# Patient Record
Sex: FEMALE | Race: WHITE | NOT HISPANIC OR LATINO
[De-identification: names, ages, dates, MRNs, and addresses within clinical notes are randomized per-mention and may not be internally consistent; named-entity substitution may affect disease eponyms.]

---

## 2022-06-21 ENCOUNTER — APPOINTMENT (OUTPATIENT)
Dept: ORTHOPEDIC SURGERY | Facility: CLINIC | Age: 87
End: 2022-06-21
Payer: MEDICARE

## 2022-06-21 DIAGNOSIS — I10 ESSENTIAL (PRIMARY) HYPERTENSION: ICD-10-CM

## 2022-06-21 DIAGNOSIS — Z90.13 ACQUIRED ABSENCE OF BILATERAL BREASTS AND NIPPLES: ICD-10-CM

## 2022-06-21 DIAGNOSIS — Z95.5 PRESENCE OF CORONARY ANGIOPLASTY IMPLANT AND GRAFT: ICD-10-CM

## 2022-06-21 PROCEDURE — 20610 DRAIN/INJ JOINT/BURSA W/O US: CPT | Mod: 50

## 2022-06-21 PROCEDURE — 99213 OFFICE O/P EST LOW 20 MIN: CPT | Mod: 25

## 2022-06-21 RX ORDER — FUROSEMIDE 20 MG/1
20 TABLET ORAL
Qty: 30 | Refills: 0 | Status: ACTIVE | COMMUNITY
Start: 2022-06-08

## 2022-06-21 RX ORDER — LEVOTHYROXINE SODIUM 0.09 MG/1
88 TABLET ORAL
Qty: 90 | Refills: 0 | Status: ACTIVE | COMMUNITY
Start: 2022-01-08

## 2022-06-21 RX ORDER — RAMIPRIL 5 MG/1
5 CAPSULE ORAL
Qty: 90 | Refills: 0 | Status: ACTIVE | COMMUNITY
Start: 2021-10-18

## 2022-06-21 RX ORDER — MIRABEGRON 25 MG/1
25 TABLET, FILM COATED, EXTENDED RELEASE ORAL
Qty: 30 | Refills: 0 | Status: ACTIVE | COMMUNITY
Start: 2022-06-08

## 2022-06-21 RX ORDER — HYDROCORTISONE 1 %
12 CREAM (GRAM) TOPICAL
Qty: 400 | Refills: 0 | Status: ACTIVE | COMMUNITY
Start: 2022-05-31

## 2022-06-21 RX ORDER — ATORVASTATIN CALCIUM 40 MG/1
40 TABLET, FILM COATED ORAL
Qty: 90 | Refills: 0 | Status: ACTIVE | COMMUNITY
Start: 2021-11-23

## 2022-06-21 RX ORDER — CEFPODOXIME PROXETIL 200 MG/1
200 TABLET, FILM COATED ORAL
Qty: 14 | Refills: 0 | Status: COMPLETED | COMMUNITY
Start: 2022-05-02

## 2022-06-21 RX ORDER — FLUTICASONE PROPIONATE 50 UG/1
50 SPRAY, METERED NASAL
Qty: 48 | Refills: 0 | Status: ACTIVE | COMMUNITY
Start: 2022-02-28

## 2022-06-21 RX ORDER — METOPROLOL SUCCINATE 50 MG/1
50 TABLET, EXTENDED RELEASE ORAL
Qty: 90 | Refills: 0 | Status: ACTIVE | COMMUNITY
Start: 2021-11-01

## 2022-06-21 RX ORDER — GABAPENTIN 300 MG/1
300 CAPSULE ORAL
Qty: 270 | Refills: 0 | Status: ACTIVE | COMMUNITY
Start: 2021-10-18

## 2022-06-21 RX ORDER — OMEGA-3/DHA/EPA/FISH OIL 35-113.5MG
1000 TABLET,CHEWABLE ORAL
Qty: 90 | Refills: 0 | Status: ACTIVE | COMMUNITY
Start: 2021-11-29

## 2022-06-21 RX ORDER — DOXYCYCLINE HYCLATE 100 MG/1
100 TABLET ORAL
Qty: 14 | Refills: 0 | Status: COMPLETED | COMMUNITY
Start: 2022-05-14

## 2022-06-21 NOTE — PHYSICAL EXAM
[Bilateral] : knee bilaterally [NL (0)] : extension 0 degrees [4___] : hamstring 4[unfilled]/5 [] : light touch is intact throughout [TWNoteComboBox7] : flexion 100 degrees

## 2022-06-21 NOTE — DISCUSSION/SUMMARY
[de-identified] : The risks, benefits, and alternatives to corticosteroid injection were reviewed with the patient. Risks outlined include, but are not limited to infection, sepsis, bleeding, scarring, skin discoloration, temporary increase in pain, syncopal episode, failure to resolve symptoms, symptoms recurrence, allergic reaction, flare reaction, and elevation of blood sugar in diabetics. Patient understood the risks and asked to proceed with this treatment course.\par \par Progress note completed by ELAINA Irwin.\par \par Physician Instructions:\par The documentation recorded by the PA accurately reflects the service I personally performed and decisions made by me.\par

## 2022-06-21 NOTE — HISTORY OF PRESENT ILLNESS
[2] : 2 [0] : 0 [Dull/Aching] : dull/aching [Localized] : localized [Retired] : Work status: retired [de-identified] : severe oa knees. Jack helpped some . Not a surgical candidate for total knee . pain walking , minimal pain at rest. left knee more pain than r [] : Post Surgical Visit: no [FreeTextEntry1] : bilateral knees [de-identified] : none

## 2022-06-21 NOTE — PROCEDURE
[FreeTextEntry3] : Large Joint Injection was performed in the right knee because of pain and inflammation. \par Zilretta: An injection of Zilretta 5 mg ,  \par Lidocaine: 4 cc\par \par Patient has tried OTC's including aspirin, Ibuprofen, Aleve etc or prescription NSAIDS, and/or exercises at home and/ or physical therapy without satisfactory response and Patient has decreased mobility in the joint. The risks, benefits, and alternatives to cortisone injection were explained in full to the patient. Risks outlined include but are not limited to infection, sepsis, bleeding, scarring, skin discoloration, temporary increase in pain, syncopal episode, failure to resolve symptoms, allergic reaction, symptom recurrence, and elevation of blood sugar in diabetics. Patient understood the risks. All questions were answered. After discussion of options, patient requested an injection. Oral informed consent was obtained. Sterile technique was utilized for the procedure including the preparation of the solutions used for the injection. Injection site was prepped with betadine and alcohol. Ethyl chloride sprayed topically. Sterile technique used. Patient tolerated procedure well. \par \par Post Procedure Instructions: Patient was advised to call if redness, pain, or fever occur. Apply ice for 15 min. every 2 hours for the next 12-24 hours as tolerated. Patient was advised to rest the joint(s) for 1-2 days.\par \par Large Joint Injection was performed in the left knee because of pain and inflammation. \par Zilretta: An injection of Zilretta 5 mg ,  \par Lidocaine: 4 cc\par \par Patient has tried OTC's including aspirin, Ibuprofen, Aleve etc or prescription NSAIDS, and/or exercises at home and/ or physical therapy without satisfactory response and Patient has decreased mobility in the joint. The risks, benefits, and alternatives to cortisone injection were explained in full to the patient. Risks outlined include but are not limited to infection, sepsis, bleeding, scarring, skin discoloration, temporary increase in pain, syncopal episode, failure to resolve symptoms, allergic reaction, symptom recurrence, and elevation of blood sugar in diabetics. Patient understood the risks. All questions were answered. After discussion of options, patient requested an injection. Oral informed consent was obtained. Sterile technique was utilized for the procedure including the preparation of the solutions used for the injection. Injection site was prepped with betadine and alcohol. Ethyl chloride sprayed topically. Sterile technique used. Patient tolerated procedure well. \par \par Post Procedure Instructions: Patient was advised to call if redness, pain, or fever occur. Apply ice for 15 min. every 2 hours for the next 12-24 hours as tolerated. Patient was advised to rest the joint(s) for 1-2 days.\par \par

## 2022-07-07 ENCOUNTER — APPOINTMENT (OUTPATIENT)
Dept: ORTHOPEDIC SURGERY | Facility: CLINIC | Age: 87
End: 2022-07-07

## 2022-07-07 VITALS — BODY MASS INDEX: 33.13 KG/M2 | WEIGHT: 180 LBS | HEIGHT: 62 IN

## 2022-07-07 PROCEDURE — 20552 NJX 1/MLT TRIGGER POINT 1/2: CPT | Mod: 59

## 2022-07-07 PROCEDURE — J3490M: CUSTOM

## 2022-07-07 PROCEDURE — 20526 THER INJECTION CARP TUNNEL: CPT | Mod: RT

## 2022-07-07 PROCEDURE — 99213 OFFICE O/P EST LOW 20 MIN: CPT | Mod: 25

## 2022-07-07 NOTE — PHYSICAL EXAM
[Bilateral] : knee bilaterally [NL (0)] : extension 0 degrees [4___] : hamstring 4[unfilled]/5 [Right] : right hand [Volar Wrist] : volar wrist [5___] : grasp 5[unfilled]/5 [de-identified] : left lateral rotation 60 degrees [TWNoteComboBox6] : right lateral rotation 60 degrees [] : no erythema [TWNoteComboBox7] : flexion 100 degrees

## 2022-07-07 NOTE — HISTORY OF PRESENT ILLNESS
[de-identified] : Follow up today. She had relief with bilateral knee CSI at the last visit. Still notes pain in the right wrist and neck/shoulder.

## 2022-07-07 NOTE — PROCEDURE
2.5 YEAR WELL CHILD EXAMINATION    Nursing notes reviewed and accepted.    Joe Velez is a 2 year old male here with his Mom and Dad for 2.5 year well examination.    Parental concerns include: None .    Interim History:  Ear infection and sore throat  Normal illnesses.    Nothing good    DIET/ACTIVITY:  Child is taking whole milk, 24-40 oz per day     Child is eating three meals per day plus snacks.  Diet: Overall okay    Screen time family plan: Yes    Enjoys running around/physical activity: Yes    ELIMINATION PATTERNS:  Bowel movements are soft.  Child is showing interest in potty training.    SLEEP:  Child is sleeping well but in his parents room    MEDICATIONS:  Reviewed and updated/accepted per EPIC.  Current Medications    ACETAMINOPHEN CHILDRENS PO        DIPHENHYDRAMINE HCL (BENADRYL ALLERGY PO)        IBUPROFEN PO        MULTIPLE VITAMINS-MINERALS (MULTIVITAMIN PO)           ALLERGIES:  Reviewed and updated/accepted per EPIC.  Allergies as of 09/28/2017 - Reviewed 09/28/2017   Allergen Reaction Noted   • Cefdinir HIVES 03/03/2016       SOCIAL/FAMILY HISTORY:  Reviewed and updated/accepted per [facility].  :  With family.    PAST MEDICAL HISTORY:  Reviewed and updated/accepted per EPIC.  There are no active problems to display for this patient.      DEVELOPMENT:  Concerns:  None  Vision concerns:  no  Hearing concerns: no    Social-Emotional:  Increasing imaginary play (dolls/toys):  Yes  Play including other children (tea party, etc): Yes    Communication:  Using short 3-4 word phrases: Yes  Understandable at least 50% of time: Yes    Cognitive:  Knows what animal or object says/does: Yes  Points to 6 body parts: Yes    Physical:  Jumps up and down: Yes  Throws ball: Yes  Washes and dries hands: Yes    SCREENING/SAFETY:  Child is in a backseat car seat in the car appropriate for age and weight.    Second hand smoke exposure:  no    TB risk:  Low  Lead risk:  Low    Oral health risk:  Low, has  not had dental appointment    Anticipatory Guidance:  Routine anticipatory guidance regarding safety and development of a 2 year old was discussed verbally or through a written handout, including but not limited to:  · Helmets, seatbelts and safety equipment  · Guns in the home  · Toilet training  · Outdoor safety    REVIEW OF SYSTEMS:   A 10 point review of systems was performed including constitutional, EENT, cardiovascular, respiratory, gastrointestinal, genitourinary, skin, musculoskeletal and neurologic systems.  Apart from those items mentioned above, it is negative.    PHYSICAL EXAM:  Visit Vitals  Pulse 108   Temp 97.5 °F (36.4 °C) (Tympanic)   Resp 24   Ht 3' 1.5\" (0.953 m)   Wt 15.8 kg   HC 49.7 cm (19.57\")   BMI 17.40 kg/m²     92 %ile (Z= 1.39) based on CDC 2-20 Years weight-for-age data using vitals from 9/28/2017.  87 %ile (Z= 1.11) based on CDC 2-20 Years stature-for-age data using vitals from 9/28/2017.  61 %ile (Z= 0.28) based on [facility] 0-36 Months head circumference-for-age data using vitals from 9/28/2017.  86 %ile (Z= 1.07) based on CDC 2-20 Years weight-for-recumbent length data using vitals from 9/28/2017.    GENERAL:  The patient is alert, well-nourished appearing and in no distress.  SKIN:  No rashes.  HEAD: Normocephalic  EYES:  Normal lids, sclerae and conjunctivae bilaterally.  Red reflex bilaterally.  Negative cover-uncover test.  EARS:  Normal pinnae, canals, tympanic membranes bilaterally.  NOSE:  No drainage.  Septum and turbinates normal.     OROPHARYNX:  No erythema, no exudate. Teeth: no caries noted.  NECK:  Supple, no thyromegaly, lymphadenopathy or masses.  Full range of motion.  CARDIOVASCULAR:  Regular rate and rhythm, no murmur.  Femoral pulses 2+.  LUNGS:  Clear to auscultation, normal respiratory effort.  ABDOMEN:  Soft, without hepatosplenomegaly or masses.  BACK:  Straight.  MUSCULOSKELETAL:  gross range of motion exam normal for upper and lower extremities, normal  gait  GENITALIA:  Normal male, Puberty Stage 1  and Testicles Normal Bilaterally  NEUROLOGICAL:  Normal muscle tone and strength, reflexes 2+, facial movements symmetric     ASSESSMENT:  Healthy 2.5 year male with normal growth and development.  Excessive milk intake improving.  Consider repeat lead/cbc at 3-4    PLAN:  Routine anticipatory guidance regarding safety and development of a 2 year old was discussed   Vaccines:  Influenza (IIV)   Patient WIR ([place] Immunization Registry) reviewed.       Two year information handout given.  Follow up at 3 years of age, sooner if concerns.         [FreeTextEntry3] : The risks, benefits and contents of the injection have been discussed.  Risks include but are not limited to allergic reaction, flare reaction, permanent white skin discoloration at the injection site and infection.  The patient understands the risks and agrees to having the injection.  All questions have been answered. Oral consent is obtained.\par \par A sterile prep of the right volar wrist was performed and the carpal tunnel was entered and injected with 2 cc of Marcaine and 7.5 mg of Kenalog. The patient tolerated the procedure well without complication. The patient was instructed to ice the area of the injection for 20min every 2 hours, until bedtime.\par \par Trigger Point was performed in the right trapezius muscle because of pain and inflammation. Anesthesia: ethyl chloride sprayed topically.\par Decadron: An injection of Decadron 4 mg , \par Kenalog: An injection of Kenalog 5 mg , \par Marcaine: 2 cc. \par \par Patient has tried OTC's including aspirin, Ibuprofen, Aleve etc or prescription NSAIDS, and/or exercises at home and/ or physical therapy without satisfactory response. The risks, benefits, and alternatives to cortisone injection were explained in full to the patient. Risks outlined include but are not limited to infection, sepsis, bleeding, scarring, skin discoloration, temporary increase in pain, syncopal episode, failure to resolve symptoms, allergic reaction, and symptom recurrence. Patient understands the risks. All questions were answered. After discussion of options, patient requested an injection. Oral informed consent was obtained. Sterile technique was utilized for the procedure including the preparation of the solutions used for the injection. Injection site was prepped with betadine and alcohol. Ethyl chloride sprayed topically. Sterile technique used. Patient tolerated procedure well. \par \par Post Procedure Instructions: Patient was advised to call if redness, pain, or fever occur. Apply ice for 15 min. every 2 hours for the next 12-24 hours as tolerated. Patient was advised to rest the joint(s) for 1-2 days.\par

## 2022-07-07 NOTE — DISCUSSION/SUMMARY
[de-identified] : The risks, benefits, and alternatives to corticosteroid injection were reviewed with the patient. Risks outlined include, but are not limited to infection, sepsis, bleeding, scarring, skin discoloration, temporary increase in pain, syncopal episode, failure to resolve symptoms, symptoms recurrence, allergic reaction, flare reaction, and elevation of blood sugar in diabetics. Patient understood the risks and asked to proceed with this treatment course.\par \par Advised emg and ncs to evaluate carpal tunnel and even at her age to consider carpal tunnel release. due to age she does not want total knees aand or total shoulders. all explained\par

## 2022-09-27 ENCOUNTER — APPOINTMENT (OUTPATIENT)
Dept: ORTHOPEDIC SURGERY | Facility: CLINIC | Age: 87
End: 2022-09-27

## 2022-09-27 VITALS — HEIGHT: 62 IN | BODY MASS INDEX: 33.13 KG/M2 | WEIGHT: 180 LBS

## 2022-09-27 DIAGNOSIS — M50.90 CERVICAL DISC DISORDER, UNSPECIFIED, UNSPECIFIED CERVICAL REGION: ICD-10-CM

## 2022-09-27 DIAGNOSIS — M75.41 IMPINGEMENT SYNDROME OF RIGHT SHOULDER: ICD-10-CM

## 2022-09-27 PROCEDURE — 20610 DRAIN/INJ JOINT/BURSA W/O US: CPT

## 2022-09-27 PROCEDURE — 99213 OFFICE O/P EST LOW 20 MIN: CPT | Mod: 25

## 2022-09-27 PROCEDURE — J3490M: CUSTOM

## 2022-09-27 NOTE — PHYSICAL EXAM
[Right] : right hand [Volar Wrist] : volar wrist [5___] : grasp 5[unfilled]/5 [Bilateral] : knee bilaterally [NL (0)] : extension 0 degrees [4___] : hamstring 4[unfilled]/5 [de-identified] : left lateral rotation 60 degrees [TWNoteComboBox6] : right lateral rotation 60 degrees [] : no erythema [FreeTextEntry3] : noticed redness distal 1/3 left leg , no severe, mild swelling and vascular changes chronic noted.  [TWNoteComboBox7] : flexion 100 degrees

## 2022-09-27 NOTE — DISCUSSION/SUMMARY
[de-identified] : The risks, benefits, and alternatives to corticosteroid injection were reviewed with the patient. Risks outlined include, but are not limited to infection, sepsis, bleeding, scarring, skin discoloration, temporary increase in pain, syncopal episode, failure to resolve symptoms, symptoms recurrence, allergic reaction, flare reaction, and elevation of blood sugar in diabetics. Patient understood the risks and asked to proceed with this treatment course.\par \par \par Discussed with her and her daughter that cortisone can make leg cellulitis worse. advised no injection either knee with current celluliis. even injected shoulder risk of affecting leg cellulitis explained and she states she is having enough  pan to take that risk. all explained. if cellulitis worsens advised vascular evaluation with Dr Mckeon.

## 2022-09-27 NOTE — PROCEDURE
[FreeTextEntry3] : Large Joint Injection was performed in the right subacromial space because of pain and inflammation. \par Decadron: An injection of Decadron 4 mg,\par Kenalog: An injection of Kenalog 5 mg, \par Marcaine: 2 cc. \par \par Patient has tried OTC's including aspirin, Ibuprofen, Aleve etc or prescription NSAIDS, and/or exercises at home and/ or physical therapy without satisfactory response and Patient has decreased mobility in the joint. The risks, benefits, and alternatives to cortisone injection were explained in full to the patient. Risks outlined include but are not limited to infection, sepsis, bleeding, scarring, skin discoloration, temporary increase in pain, syncopal episode, failure to resolve symptoms, allergic reaction, symptom recurrence, and elevation of blood sugar in diabetics. Patient understood the risks. All questions were answered. After discussion of options, patient requested an injection. Oral informed consent was obtained. Sterile technique was utilized for the procedure including the preparation of the solutions used for the injection. Injection site was prepped with betadine and alcohol. Ethyl chloride sprayed topically. Sterile technique used. Patient tolerated procedure well. \par \par Post Procedure Instructions: Patient was advised to call if redness, pain, or fever occur. Apply ice for 15 min. every 2 hours for the next 12-24 hours as tolerated. Patient was advised to rest the joint(s) for 1-2 days.\par \par

## 2022-09-27 NOTE — HISTORY OF PRESENT ILLNESS
[Dull/Aching] : dull/aching [Intermittent] : intermittent [Rest] : rest [Gradual] : gradual [4] : 4 [3] : 3 [Walking] : walking [de-identified] : Follow up today. Symptoms continue, but does note relief with TPI and wrist injections at the last visit. She continues to have pain in the bilateral knees and the neck/right shoulder. EMG was note performed. She is recently being treated for cellulitis of the left lower leg and is taking abx. [FreeTextEntry5] : pt feels weakness in the left knee

## 2022-11-04 ENCOUNTER — APPOINTMENT (OUTPATIENT)
Dept: ORTHOPEDIC SURGERY | Facility: CLINIC | Age: 87
End: 2022-11-04

## 2022-11-04 VITALS — WEIGHT: 180 LBS | BODY MASS INDEX: 33.13 KG/M2 | HEIGHT: 62 IN

## 2022-11-04 PROCEDURE — 20610 DRAIN/INJ JOINT/BURSA W/O US: CPT | Mod: 50

## 2022-11-04 PROCEDURE — 99213 OFFICE O/P EST LOW 20 MIN: CPT | Mod: 25

## 2022-11-04 NOTE — HISTORY OF PRESENT ILLNESS
[Gradual] : gradual [4] : 4 [3] : 3 [Dull/Aching] : dull/aching [Intermittent] : intermittent [Rest] : rest [Walking] : walking [de-identified] : Follow up today. Bilateral knee pain has been exacerbated recently (L>R). No new injury. Notes difficulty with stairs. She has had good relief with CSI in the past (last was Zilretta on 6/21/22) and mild relief with visco (2019). [FreeTextEntry1] : wild knees [FreeTextEntry5] : pt feels weakness in the left knee

## 2022-11-04 NOTE — PHYSICAL EXAM
[Right] : right hand [Volar Wrist] : volar wrist [5___] : grasp 5[unfilled]/5 [Bilateral] : knee bilaterally [NL (0)] : extension 0 degrees [4___] : hamstring 4[unfilled]/5 [de-identified] : left lateral rotation 60 degrees [TWNoteComboBox6] : right lateral rotation 60 degrees [] : no erythema [FreeTextEntry3] : noticed redness distal 1/3 left leg , no severe, mild swelling and vascular changes chronic noted.  [TWNoteComboBox7] : flexion 100 degrees

## 2022-11-04 NOTE — PROCEDURE
[FreeTextEntry3] : Large Joint Injection was performed to bilateral knees because of pain and inflammation. \par Decadron: An injection of Decadron 4 mg , \par Kenalog: An injection of Kenalog 5 mg , \par Marcaine: 2 cc. \par \par Patient has tried OTC's including aspirin, Ibuprofen, Aleve etc or prescription NSAIDS, and/or exercises at home and/ or physical therapy without satisfactory response and Patient has decreased mobility in the joint. The risks, benefits, and alternatives to cortisone injection were explained in full to the patient. Risks outlined include but are not limited to infection, sepsis, bleeding, scarring, skin discoloration, temporary increase in pain, syncopal episode, failure to resolve symptoms, allergic reaction, symptom recurrence, and elevation of blood sugar in diabetics. Patient understood the risks. All questions were answered. After discussion of options, patient requested an injection. Oral informed consent was obtained. Sterile technique was utilized for the procedure including the preparation of the solutions used for the injection. Injection site was prepped with betadine and alcohol. Ethyl chloride sprayed topically. Sterile technique used. Patient tolerated procedure well. \par \par Post Procedure Instructions: Patient was advised to call if redness, pain, or fever occur. Apply ice for 15 min. every 2 hours for the next 12-24 hours as tolerated. Patient was advised to rest the joint(s) for 1-2 days.\par \par

## 2022-11-04 NOTE — DISCUSSION/SUMMARY
[de-identified] : The risks, benefits, and alternatives to corticosteroid injection were reviewed with the patient. Risks outlined include, but are not limited to infection, sepsis, bleeding, scarring, skin discoloration, temporary increase in pain, syncopal episode, failure to resolve symptoms, symptoms recurrence, allergic reaction, flare reaction, and elevation of blood sugar in diabetics. Patient understood the risks and asked to proceed with this treatment course.\par \par May want to repeat visco injections.

## 2022-12-08 ENCOUNTER — APPOINTMENT (OUTPATIENT)
Dept: ORTHOPEDIC SURGERY | Facility: CLINIC | Age: 87
End: 2022-12-08
Payer: MEDICARE

## 2022-12-08 VITALS — WEIGHT: 180 LBS | HEIGHT: 62 IN | BODY MASS INDEX: 33.13 KG/M2

## 2022-12-08 PROCEDURE — 99213 OFFICE O/P EST LOW 20 MIN: CPT | Mod: 25

## 2022-12-08 PROCEDURE — 20610 DRAIN/INJ JOINT/BURSA W/O US: CPT | Mod: 50

## 2022-12-08 RX ORDER — AMOXICILLIN AND CLAVULANATE POTASSIUM 500; 125 MG/1; MG/1
500-125 TABLET, FILM COATED ORAL
Qty: 21 | Refills: 0 | Status: COMPLETED | COMMUNITY
Start: 2022-09-26

## 2022-12-08 NOTE — PROCEDURE
[FreeTextEntry3] : Viscosupplementation Injection: X-ray evidence of osteoarthritis on this or prior visit and patient has tried OTC's including aspirin, Ibuprofen, Aleve etc or prescription NSAIDS, and/or exercises at home and/ or physical therapy without satisfactory response. \par \par An injection of Synvisc One 6 ml was injected into the bilateral knee(s) after verbal consent obtained. \par \par Patient has tried OTC's including aspirin, Ibuprofen, Aleve etc or prescription NSAIDS, and/or exercises at home and/ or physical therapy without satisfactory response and Patient has decreased mobility in the joint. The risks, benefits, and alternatives to cortisone injection were explained in full to the patient. Risks outlined include but are not limited to infection, sepsis, bleeding, scarring, skin discoloration, temporary increase in pain, syncopal episode, failure to resolve symptoms, allergic reaction, and symptom recurrence. Patient understands the risks. All questions were answered. After discussion of options, patient requested an injection. Oral informed consent was obtained. Sterile technique was utilized for the procedure including the preparation of the solutions used for the injection. Injection site was prepped with betadine and alcohol. Ethyl chloride sprayed topically. Sterile technique used. Patient tolerated procedure well. \par \par Post Procedure Instructions: Patient was advised to call if redness, pain, or fever occur. Apply ice for 15 min. every 2 hours for the next 12-24 hours as tolerated. Patient was advised to rest the joint(s) for 1-2 days.\par

## 2022-12-08 NOTE — HISTORY OF PRESENT ILLNESS
[6] : 6 [0] : 0 [de-identified] : Follow up today. Symptoms continue. She had only a few days of relief following CSI at the last visit (11/4/22). She has had relief with visco injections in the past. [FreeTextEntry1] : both knees

## 2022-12-08 NOTE — DISCUSSION/SUMMARY
[de-identified] : The risks, benefits, and alternatives to viscosupplementation injection were reviewed with the patient. Risks outlined include but are not limited to infection, sepsis, bleeding, scarring, temporary increase in pain, syncopal episode, failure to resolve symptoms, symptoms recurrence, allergic reaction, flare reaction, pseudoseptic reaction. Patient understood the risks and asked to proceed with this treatment course.\par 
No

## 2022-12-08 NOTE — REASON FOR VISIT
[Family Member] : family member [FreeTextEntry2] : Patient is here for a Follow Up appointment for Both Knees.

## 2022-12-08 NOTE — PHYSICAL EXAM
[Right] : right hand [Volar Wrist] : volar wrist [5___] : grasp 5[unfilled]/5 [Bilateral] : knee bilaterally [NL (0)] : extension 0 degrees [4___] : hamstring 4[unfilled]/5 [de-identified] : left lateral rotation 60 degrees [TWNoteComboBox6] : right lateral rotation 60 degrees [] : no erythema [FreeTextEntry3] : noticed redness distal 1/3 left leg , no severe, mild swelling and vascular changes chronic noted.  [TWNoteComboBox7] : flexion 100 degrees

## 2023-03-06 ENCOUNTER — APPOINTMENT (OUTPATIENT)
Dept: ORTHOPEDIC SURGERY | Facility: CLINIC | Age: 88
End: 2023-03-06
Payer: MEDICARE

## 2023-03-06 VITALS — BODY MASS INDEX: 33.13 KG/M2 | HEIGHT: 62 IN | WEIGHT: 180 LBS

## 2023-03-06 DIAGNOSIS — G56.01 CARPAL TUNNEL SYNDROME, RIGHT UPPER LIMB: ICD-10-CM

## 2023-03-06 PROCEDURE — 73130 X-RAY EXAM OF HAND: CPT | Mod: RT

## 2023-03-06 PROCEDURE — J3490M: CUSTOM | Mod: NC

## 2023-03-06 PROCEDURE — 20526 THER INJECTION CARP TUNNEL: CPT | Mod: RT

## 2023-03-06 PROCEDURE — 20610 DRAIN/INJ JOINT/BURSA W/O US: CPT | Mod: LT

## 2023-03-06 PROCEDURE — 99215 OFFICE O/P EST HI 40 MIN: CPT | Mod: 25

## 2023-03-06 NOTE — IMAGING
[de-identified] : Right wrist with full and pain free ROM. \par +Tinel and Durkan's Test.\par Negative Tinel over the Cubital Tunnel. \par Spurling Signs are negative.\par There is no atrophy noted. \par \par Left knee with ttp over the medial and lateral jointlines.\par There is no ligamentous laxity noted.\par Minimal effusion.\par ROM  degrees.\par Strength is 5/5. LLE is nvi.

## 2023-03-06 NOTE — ASSESSMENT
[FreeTextEntry1] : Right carpal tunnel provided CSI #2\par The risks, benefits and contents of the injection have been discussed.  Risks include but are not limited to allergic reaction, flare reaction, permanent white skin discoloration at the injection site and infection.The patient understands the risks and agrees to having the injection.We also discussed that about 22% of patients have relief at a year but the rest have recurrence if the symptoms.However, if the injection is successful it is a positive predictor of benefit of surgery.Success of the injection to relieve symptoms is also a great diagnostic test and obviates the need for EMG testing.The patient verbalized understanding. All questions have been answered.A sterile prep of the right volar wrist was performed and the carpal tunnel was entered and injected with 1 cc of Lidocaine and 6mg of celestone. The patient tolerated the procedure well without complication.The patient was instructed to ice the area of the injection.\par Left knee provided CSI.\par Large joint injection of the left knee was performed. The indication for this procedure was pain, inflammation and x-ray evidence of Osteoarthritis on this or prior visit. The site was prepped with alcohol, ethyl chloride sprayed topically and sterile technique used. An injection of Lidocaine 3cc of 2% , Bupivacaine (Marcaine) 3cc of 0.5% , Triamcinolone (Kenalog) 2cc of 40 mg  was used.   Patient tolerated procedure well. Patient was advised to call if redness, pain or fever occur and apply ice for 15 minutes out of every hour for the next 12-24 hours as tolerated. The risks benefits, and alternatives have been discussed, and verbal consent was obtained.\par

## 2023-03-06 NOTE — HISTORY OF PRESENT ILLNESS
[6] : 6 [0] : 0 [de-identified] : Pt here today with complaint of right hand tingling/numbness x 3 yrs.\par Pt has received one previous CSI for carpal tunnel syndrome in 2020 and she is requesting a repeat injection.\par Pt states pain/numbness returned appx 3 weeks ago.\par Pt states she is also here for an injection to the left knee.\par \par PMH: htn, hypothyroid, CAD, CHF.\par Allergies: NKDA [] : Post Surgical Visit: no [FreeTextEntry1] : both knees [de-identified] : Dr. Stallings

## 2023-04-18 ENCOUNTER — APPOINTMENT (OUTPATIENT)
Dept: ORTHOPEDIC SURGERY | Facility: CLINIC | Age: 88
End: 2023-04-18
Payer: MEDICARE

## 2023-04-18 DIAGNOSIS — M19.011 PRIMARY OSTEOARTHRITIS, RIGHT SHOULDER: ICD-10-CM

## 2023-04-18 PROCEDURE — 20610 DRAIN/INJ JOINT/BURSA W/O US: CPT | Mod: RT

## 2023-04-18 PROCEDURE — 73030 X-RAY EXAM OF SHOULDER: CPT | Mod: RT

## 2023-04-18 PROCEDURE — 73562 X-RAY EXAM OF KNEE 3: CPT | Mod: RT

## 2023-04-18 PROCEDURE — 99213 OFFICE O/P EST LOW 20 MIN: CPT | Mod: 25

## 2023-04-18 PROCEDURE — J3490M: CUSTOM | Mod: NC

## 2023-04-18 NOTE — HISTORY OF PRESENT ILLNESS
[6] : 6 [0] : 0 [de-identified] : Pt here today states her right carpal tunnel symptoms have resolved s/p CSI.\par \par Pt states she is also here for an injection to the right knee and right shoulder CSI today.\par \par PMH: htn, hypothyroid, CAD, CHF.\par Allergies: NKDA [] : Post Surgical Visit: no [FreeTextEntry1] : both knees [de-identified] : Dr. Stallings

## 2023-04-18 NOTE — ASSESSMENT
[FreeTextEntry1] : Right knee provided CSI (Zilretta) today.\par \par \par Right shoulder GH CSI performed (Zilretta) today.\par \par Gel One ordered for the bilateral knees today\par \par RTO prn. \par \par

## 2023-04-18 NOTE — IMAGING
[Right] : right shoulder [Degenerative change] : Degenerative change [Bilateral] : knee bilaterally [de-identified] : Right upper extremity is nvi.\par Shoulder rom is mildly limited in all planes due to OA. \par Impingement Signs are minimally positive.\par Strength is 4/5 in all planes. \par mild crepitus is noted with rom. \par \par Right knee with ttp over the medial and lateral joint lines.\par There is no ligamentous laxity noted.\par Minimal effusion.\par ROM  degrees.\par Strength is 5/5. LLE is nvi. 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

## 2023-06-06 ENCOUNTER — APPOINTMENT (OUTPATIENT)
Dept: ORTHOPEDIC SURGERY | Facility: CLINIC | Age: 88
End: 2023-06-06
Payer: MEDICARE

## 2023-06-06 VITALS — WEIGHT: 180 LBS | HEIGHT: 62 IN | BODY MASS INDEX: 33.13 KG/M2

## 2023-06-06 DIAGNOSIS — M70.50 OTHER BURSITIS OF KNEE, UNSPECIFIED KNEE: ICD-10-CM

## 2023-06-06 PROCEDURE — 99213 OFFICE O/P EST LOW 20 MIN: CPT | Mod: 25

## 2023-06-06 PROCEDURE — 20610 DRAIN/INJ JOINT/BURSA W/O US: CPT | Mod: RT

## 2023-06-06 RX ORDER — IBUPROFEN 600 MG/1
600 TABLET, FILM COATED ORAL 3 TIMES DAILY
Qty: 90 | Refills: 0 | Status: ACTIVE | COMMUNITY
Start: 2023-06-06 | End: 1900-01-01

## 2023-06-06 NOTE — ASSESSMENT
[FreeTextEntry1] : The patient was advised of the diagnosis. The natural history of the pathology was explained in full to the patient in layman's terms. All questions were answered. The risks and benefits of surgical and non-surgical treatment alternatives were explained in full to the patient.\par \par NSAIDs recommended.  Patient warned of risk of NSAID medication to stomach and GI tract, risk of increase blood pressure, cardiac risk, and risk of fluid retention.  The patient should clear taking medication with internist/PMD if any problem with heart, blood pressure, or GI system exists.\par \par recomemend injection for pes bursa- r/b/a discussed- amenable\par tolerated well\par  injection of the right knee pes bursa was performed. The indication for this procedure was pain, inflammation and x-ray evidence of Osteoarthritis on this or prior visit. The site was prepped with alcohol, ethyl chloride sprayed topically and sterile technique used. An injection of Lidocaine 3cc of 2% , Bupivacaine (Marcaine) 3cc of 0.5% , Triamcinolone (Kenalog) 2cc of 40 mg  was used.   Patient tolerated procedure well. Patient was advised to call if redness, pain or fever occur and apply ice for 15 minutes out of every hour for the next 12-24 hours as tolerated. The risks benefits, and alternatives have been discussed, and verbal consent was obtained. \par \par

## 2023-06-06 NOTE — IMAGING
[de-identified] : Right upper extremity is nvi.\par Shoulder rom is mildly limited in all planes due to OA. \par Impingement Signs are minimally positive.\par Strength is 4/5 in all planes. \par mild crepitus is noted with rom. \par \par Right knee with exquisite TTP about the pes bursa\par minimal ttp over the medial and lateral joint lines.\par There is no ligamentous laxity noted.\par Minimal effusion.\par ROM  degrees.\par Strength is 5/5. LLE is nvi.

## 2023-06-06 NOTE — HISTORY OF PRESENT ILLNESS
[de-identified] : 6/6/23: pain excruciating just below the knee\par \par 4/18/23: Pt here today states her right carpal tunnel symptoms have resolved s/p CSI.\par \par Pt states she is also here for an injection to the right knee and right shoulder CSI today.\par \par PMH: htn, hypothyroid, CAD, CHF.\par Allergies: NKDA [6] : 6 [0] : 0 [] : Post Surgical Visit: no [FreeTextEntry1] : both knees [de-identified] : Dr. Stallings

## 2023-06-20 ENCOUNTER — APPOINTMENT (OUTPATIENT)
Dept: ORTHOPEDIC SURGERY | Facility: CLINIC | Age: 88
End: 2023-06-20
Payer: MEDICARE

## 2023-06-20 VITALS — WEIGHT: 180 LBS | BODY MASS INDEX: 33.13 KG/M2 | HEIGHT: 62 IN

## 2023-06-20 PROCEDURE — 20610 DRAIN/INJ JOINT/BURSA W/O US: CPT | Mod: 50

## 2023-06-20 PROCEDURE — 99213 OFFICE O/P EST LOW 20 MIN: CPT | Mod: 25

## 2023-06-20 NOTE — ASSESSMENT
[FreeTextEntry1] : The patient was advised of the diagnosis. The natural history of the pathology was explained in full to the patient in layman's terms. All questions were answered. The risks and benefits of surgical and non-surgical treatment alternatives were explained in full to the patient.\par \par NSAIDs recommended.  Patient warned of risk of NSAID medication to stomach and GI tract, risk of increase blood pressure, cardiac risk, and risk of fluid retention.  The patient should clear taking medication with internist/PMD if any problem with heart, blood pressure, or GI system exists.\par \par Large joint injection of the BILATERAL KNEES was performed. The indication for this procedure was pain, inflammation and x-ray evidence of Osteoarthritis on this or prior visit. The site was prepped with alcohol, ethyl chloride sprayed topically and sterile technique used. An injection of Lidocaine 3cc of 2% ,gel1 3mL  was used.   Patient tolerated procedure well. Patient was advised to call if redness, pain or fever occur and apply ice for 15 minutes out of every hour for the next 12-24 hours as tolerated. The risks benefits, and alternatives have been discussed, and verbal consent was obtained.

## 2023-06-20 NOTE — IMAGING
[de-identified] : bilateral knees\par \par skin intact\par TTP about te edial and lateral joint lines\par stable to stress/ligamentously stble\par NVID\par rom

## 2023-08-21 ENCOUNTER — APPOINTMENT (OUTPATIENT)
Dept: ORTHOPEDIC SURGERY | Facility: CLINIC | Age: 88
End: 2023-08-21
Payer: MEDICARE

## 2023-08-21 VITALS — WEIGHT: 180 LBS | HEIGHT: 62 IN | BODY MASS INDEX: 33.13 KG/M2

## 2023-08-21 PROCEDURE — 99214 OFFICE O/P EST MOD 30 MIN: CPT | Mod: 25

## 2023-08-21 PROCEDURE — 20610 DRAIN/INJ JOINT/BURSA W/O US: CPT | Mod: 50

## 2023-08-21 NOTE — IMAGING
[de-identified] : bilateral knees  skin intact TTP about the medial and lateral joint lines stable to stress/ligamentously stble NVID rom

## 2023-08-21 NOTE — PROCEDURE
[Large Joint Injection] : Large joint injection [Bilateral] : bilaterally of the [Knee] : knee [Pain] : pain [Inflammation] : inflammation [X-ray evidence of Osteoarthritis on this or prior visit] : x-ray evidence of Osteoarthritis on this or prior visit [Alcohol] : alcohol [Ethyl Chloride sprayed topically] : ethyl chloride sprayed topically [Sterile technique used] : sterile technique used [___ cc    32 units 5mg] : Zilretta ~Vcc of 32 units 5 mg

## 2023-08-21 NOTE — HISTORY OF PRESENT ILLNESS
[de-identified] : 8/21/2023: bilateral knee pain, interested in cortisone injections. (Zilretta).

## 2023-08-21 NOTE — ASSESSMENT
[FreeTextEntry1] : The patient was advised of the diagnosis. The natural history of the pathology was explained in full to the patient in layman's terms. All questions were answered. The risks and benefits of surgical and non-surgical treatment alternatives were explained in full to the patient.  NSAIDs recommended.  Patient warned of risk of NSAID medication to stomach and GI tract, risk of increase blood pressure, cardiac risk, and risk of fluid retention.  The patient should clear taking medication with internist/PMD if any problem with heart, blood pressure, or GI system exists.  Pt was given b/l knee Zilretta today.

## 2023-08-21 NOTE — REASON FOR VISIT
[FreeTextEntry2] : follow up bilateral knees. Pain is worse, Received orthovisc series with no relief.

## 2023-11-09 ENCOUNTER — APPOINTMENT (OUTPATIENT)
Dept: ORTHOPEDIC SURGERY | Facility: CLINIC | Age: 88
End: 2023-11-09

## 2023-11-24 ENCOUNTER — APPOINTMENT (OUTPATIENT)
Dept: ORTHOPEDIC SURGERY | Facility: CLINIC | Age: 88
End: 2023-11-24
Payer: MEDICARE

## 2023-11-24 VITALS — WEIGHT: 180 LBS | HEIGHT: 61 IN | BODY MASS INDEX: 33.99 KG/M2

## 2023-11-24 DIAGNOSIS — Z87.891 PERSONAL HISTORY OF NICOTINE DEPENDENCE: ICD-10-CM

## 2023-11-24 DIAGNOSIS — S43.492A OTHER SPRAIN OF LEFT SHOULDER JOINT, INITIAL ENCOUNTER: ICD-10-CM

## 2023-11-24 PROCEDURE — 73030 X-RAY EXAM OF SHOULDER: CPT | Mod: LT

## 2023-11-24 PROCEDURE — J3490M: CUSTOM | Mod: NC

## 2023-11-24 PROCEDURE — 99214 OFFICE O/P EST MOD 30 MIN: CPT | Mod: 25

## 2023-11-24 PROCEDURE — 20610 DRAIN/INJ JOINT/BURSA W/O US: CPT | Mod: LT

## 2024-01-21 ENCOUNTER — APPOINTMENT (OUTPATIENT)
Dept: ORTHOPEDIC SURGERY | Facility: CLINIC | Age: 89
End: 2024-01-21
Payer: MEDICARE

## 2024-01-21 VITALS — BODY MASS INDEX: 33.13 KG/M2 | WEIGHT: 180 LBS | HEIGHT: 62 IN

## 2024-01-21 DIAGNOSIS — M17.11 UNILATERAL PRIMARY OSTEOARTHRITIS, RIGHT KNEE: ICD-10-CM

## 2024-01-21 PROCEDURE — 99214 OFFICE O/P EST MOD 30 MIN: CPT | Mod: 25

## 2024-01-21 PROCEDURE — 99204 OFFICE O/P NEW MOD 45 MIN: CPT | Mod: 25

## 2024-01-21 PROCEDURE — 20610 DRAIN/INJ JOINT/BURSA W/O US: CPT | Mod: 50

## 2024-01-21 PROCEDURE — J3490M: CUSTOM | Mod: NC

## 2024-01-21 NOTE — PROCEDURE
[Large Joint Injection] : Large joint injection [Bilateral] : bilaterally of the [Knee] : knee [Pain] : pain [Inflammation] : inflammation [Alcohol] : alcohol [Betadine] : betadine [___ cc    0.25%] : Bupivacaine (Marcaine) ~Vcc of 0.25%  [___ cc    80mg] : Methylprednisolone (Depomedrol) ~Vcc of 80 mg  [] : Patient tolerated procedure well [Call if redness, pain or fever occur] : call if redness, pain or fever occur [Apply ice for 15min out of every hour for the next 12-24 hours as tolerated] : apply ice for 15 minutes out of every hour for the next 12-24 hours as tolerated [Patient was advised to rest the joint(s) for ____ days] : patient was advised to rest the joint(s) for [unfilled] days [Previous OTC use and PT nontherapeutic] : patient has tried OTC's including aspirin, Ibuprofen, Aleve, etc or prescription NSAIDS, and/or exercises at home and/or physical therapy without satisfactory response [Patient had decreased mobility in the joint] : patient had decreased mobility in the joint [Risks, benefits, alternatives discussed / Verbal consent obtained] : the risks benefits, and alternatives have been discussed, and verbal consent was obtained

## 2024-01-21 NOTE — HISTORY OF PRESENT ILLNESS
[Left Leg] : left leg [Right Leg] : right leg [Gradual] : gradual [7] : 7 [Constant] : constant [Injection therapy] : injection therapy [de-identified] : 1/21/24: pt is an 88 y/o female with wild knee pain. pt usually sees Dr. Stacy for knee pain. pt received orthovisc injections on 8/21/23. pt says that the pain got slightly better after inj therapy, but never goes away fully. pt says pain has been getting worse over the last few months. pt has been taking advil, but it does not help.  [] : no [FreeTextEntry1] : wild knees [de-identified] : motion  [de-identified] : Regis  [de-identified] : orthovisc injection

## 2024-01-21 NOTE — IMAGING
[de-identified] : b/l knees:  minimal effusion 0-100 + crepitus +med/lat joint line tenderness no gross instability NVI  xrays b/l knees reviewed from last year: severe degenerative changes, with loss of lateral joint space and valgus deformity

## 2024-01-21 NOTE — ASSESSMENT
[FreeTextEntry1] : CSI both knees today at her request ice and rest today activities as tolerated f/u in 1-2 weeks with Dr Stacy.

## 2024-02-29 ENCOUNTER — APPOINTMENT (OUTPATIENT)
Dept: ORTHOPEDIC SURGERY | Facility: CLINIC | Age: 89
End: 2024-02-29
Payer: MEDICARE

## 2024-02-29 VITALS — BODY MASS INDEX: 33.13 KG/M2 | WEIGHT: 180 LBS | HEIGHT: 62 IN

## 2024-02-29 DIAGNOSIS — Z00.00 ENCOUNTER FOR GENERAL ADULT MEDICAL EXAMINATION W/OUT ABNORMAL FINDINGS: ICD-10-CM

## 2024-02-29 PROCEDURE — 99213 OFFICE O/P EST LOW 20 MIN: CPT

## 2024-02-29 RX ORDER — METHYLPREDNISOLONE 4 MG/1
4 TABLET ORAL
Qty: 1 | Refills: 0 | Status: ACTIVE | COMMUNITY
Start: 2024-02-29 | End: 1900-01-01

## 2024-02-29 NOTE — ASSESSMENT
[FreeTextEntry1] : Previous notes and images reviewed Treatment options discussed  MDP sent for pain and inflammation - discussed timing of medication refills Discussed injection timing Follow up with Dr. Stacy in 2 weeks prn

## 2024-02-29 NOTE — HISTORY OF PRESENT ILLNESS
[1] : 2 [8] : 8 [Localized] : localized [Dull/Aching] : dull/aching [Standing] : standing [Walking] : walking [Stairs] : stairs [Steroid] : Steroid [] : Post Surgical Visit: no [de-identified] : Here to f/u on left knee. Knee still painful, had injection in January. Hx of OA, no relief with visco.  [FreeTextEntry1] : Left knee and shoulder [FreeTextEntry5] : Patient is here with reoccuring knee and shoulder pain. since 3 days ago, no new injury, H/O arthritis.

## 2024-05-23 ENCOUNTER — APPOINTMENT (OUTPATIENT)
Dept: ORTHOPEDIC SURGERY | Facility: CLINIC | Age: 89
End: 2024-05-23
Payer: MEDICARE

## 2024-05-23 VITALS — WEIGHT: 180 LBS | HEIGHT: 62 IN | BODY MASS INDEX: 33.13 KG/M2

## 2024-05-23 DIAGNOSIS — M17.12 UNILATERAL PRIMARY OSTEOARTHRITIS, LEFT KNEE: ICD-10-CM

## 2024-05-23 DIAGNOSIS — M19.012 PRIMARY OSTEOARTHRITIS, LEFT SHOULDER: ICD-10-CM

## 2024-05-23 PROCEDURE — 73564 X-RAY EXAM KNEE 4 OR MORE: CPT | Mod: LT

## 2024-05-23 PROCEDURE — 73030 X-RAY EXAM OF SHOULDER: CPT | Mod: LT

## 2024-05-23 PROCEDURE — J3490M: CUSTOM | Mod: NC

## 2024-05-23 PROCEDURE — 99213 OFFICE O/P EST LOW 20 MIN: CPT

## 2024-05-23 NOTE — HISTORY OF PRESENT ILLNESS
[de-identified] : 5/23/24: 88 yo female with left knee and left shoulder pain for years. She sees Dr. davis and has been treated with CSI in the past. She has pain with walking and occaisonal buckling of her knee. There is pain in her shoulder with reaching and at night. She took MDP in Feb. with minimal relief. She request repeat CSI today.   [] : no

## 2024-05-23 NOTE — ASSESSMENT
[FreeTextEntry1] : Discussed timing and frequency of injections. L GH and L knee injections given today. Ice, rest. Tylenol prn.  Follow up as needed. May consider visco.  Procedure note: Large Joint Injection was performed because of pain and inflammation, failure of conservative treatment.   Medications: Depo-Medrol: 1 cc, 80 mg. Lidocaine: 2 cc, 1%.  Marcaine: 2 cc, .25%.   Medication was injected in the left glenohumeral joint and left knee joint. Patient has tried OTC's including aspirin, Ibuprofen, Aleve etc or prescription NSAIDs, and/or exercises at home and/ or physical therapy without satisfactory response. The risks, benefits, and alternatives to cortisone injection were explained in full to the patient. Risks outlined include but are not limited to infection, sepsis, bleeding, scarring, skin discoloration, temporary increase in pain, syncopal episode, failure to resolve symptoms, allergic reaction, symptom recurrence, and elevation of blood sugar in diabetics. Patient understood the risks. All questions were answered. After discussion of options, patient requested an injection. Oral informed consent was obtained and sterile prep of the injection site was performed using alcohol. Sterile technique was utilized for the procedure including the preparation of the solutions used for the injection. Ethyl chloride spray was used topically.  Sterile technique used. Patient tolerated procedure well. Post Procedure Instructions: Patient was advised to call if redness, pain, or fever occur and apply ice for 15 min. out of every hour for the next 12-24 hours as tolerated. patient was advised to rest the joint(s) for 2 days.

## 2024-05-23 NOTE — IMAGING
[Glenohumeral arthritis] : Glenohumeral arthritis [Left] : left knee [advanced tricompartmental OA with lateral compartment narrowing and valgus alignment] : advanced tricompartmental OA with lateral compartment narrowing and valgus alignment

## 2024-09-09 ENCOUNTER — APPOINTMENT (OUTPATIENT)
Dept: ORTHOPEDIC SURGERY | Facility: CLINIC | Age: 89
End: 2024-09-09
Payer: MEDICARE

## 2024-09-09 DIAGNOSIS — M19.011 PRIMARY OSTEOARTHRITIS, RIGHT SHOULDER: ICD-10-CM

## 2024-09-09 DIAGNOSIS — M17.12 UNILATERAL PRIMARY OSTEOARTHRITIS, LEFT KNEE: ICD-10-CM

## 2024-09-09 PROCEDURE — 20610 DRAIN/INJ JOINT/BURSA W/O US: CPT | Mod: RT

## 2024-09-09 PROCEDURE — J3490M: CUSTOM | Mod: NC

## 2024-09-09 PROCEDURE — 73030 X-RAY EXAM OF SHOULDER: CPT | Mod: RT

## 2024-09-09 PROCEDURE — 99214 OFFICE O/P EST MOD 30 MIN: CPT | Mod: 25

## 2024-09-09 RX ORDER — OXYCODONE AND ACETAMINOPHEN 5; 325 MG/1; MG/1
5-325 TABLET ORAL
Qty: 20 | Refills: 0 | Status: ACTIVE | COMMUNITY
Start: 2024-09-09 | End: 1900-01-01

## 2024-09-09 NOTE — ASSESSMENT
[FreeTextEntry1] : The patient was advised of the diagnosis. The natural history of the pathology was explained in full to the patient in layman's terms. All questions were answered. The risks and benefits of surgical and non-surgical treatment alternatives were explained in full to the patient.  Large joint injection of the right shoulder and AC joint was performed. The indication for this procedure was pain, inflammation. The site was prepped with alcohol, ethyl chloride sprayed topically and sterile technique used. An injection of Lidocaine 3cc of 2% , Bupivacaine (Marcaine) 3cc of 0.5% , Triamcinolone (Kenalog) 2cc of 40 mg  was used.   Patient tolerated procedure well. Patient was advised to call if redness, pain or fever occur and apply ice for 15 minutes out of every hour for the next 12-24 hours as tolerated. The risks benefits, and alternatives have been discussed, and verbal consent was obtained.  Large joint injection of the left knee was performed. The indication for this procedure was pain, inflammation. The site was prepped with alcohol, ethyl chloride sprayed topically and sterile technique used. An injection of Lidocaine 3cc of 2% , Bupivacaine (Marcaine) 3cc of 0.5% , Triamcinolone (Kenalog) 2cc of 40 mg  was used.   Patient tolerated procedure well. Patient was advised to call if redness, pain or fever occur and apply ice for 15 minutes out of every hour for the next 12-24 hours as tolerated. The risks benefits, and alternatives have been discussed, and verbal consent was obtained.   Pt will rto prn.   Provided Oxycodone 5 tid prn severe pain #25 today.

## 2024-09-09 NOTE — HISTORY OF PRESENT ILLNESS
[de-identified] : 9/9/2024:  pt here with 1 week of right shoulder pain and intermittent left knee pain.   PMH: gastric ulcer, congestive heart failure, HTN, COPD, Hypothyroidism, shoulder OA, bilateral knee OA.  Allergies: NKDA.

## 2024-09-09 NOTE — IMAGING
[de-identified] : Spurling Signs are negative Right shoulder with limited ROM in all planes. AC joint TTP. Diffuse subacromial TTP noted. ROM significantly limited due to discomfort. Examination is limited. RUE is nvi.  Left knee with limited ROM medial and lateral joint line ttp no ligamentous laxity left hip with pain free ROM / nttp. examination is limited due to limited mobility (in wheelchair).  Previous left knee xray reviewed with family (severe tricompartmental OA)  Right shoulder 2 view x-ray performed today showed: severe GH OA / severe AC joint OA.

## 2024-10-28 ENCOUNTER — APPOINTMENT (OUTPATIENT)
Dept: ORTHOPEDIC SURGERY | Facility: CLINIC | Age: 89
End: 2024-10-28
Payer: MEDICARE

## 2024-10-28 DIAGNOSIS — M19.011 PRIMARY OSTEOARTHRITIS, RIGHT SHOULDER: ICD-10-CM

## 2024-10-28 DIAGNOSIS — M19.012 PRIMARY OSTEOARTHRITIS, LEFT SHOULDER: ICD-10-CM

## 2024-10-28 PROCEDURE — 20610 DRAIN/INJ JOINT/BURSA W/O US: CPT | Mod: 50

## 2024-10-28 PROCEDURE — 73030 X-RAY EXAM OF SHOULDER: CPT | Mod: LT

## 2024-10-28 PROCEDURE — 99213 OFFICE O/P EST LOW 20 MIN: CPT | Mod: 25

## 2024-10-28 RX ORDER — OXYCODONE AND ACETAMINOPHEN 5; 325 MG/1; MG/1
5-325 TABLET ORAL EVERY 4 HOURS
Qty: 30 | Refills: 0 | Status: ACTIVE | COMMUNITY
Start: 2024-10-28 | End: 1900-01-01

## 2025-03-10 ENCOUNTER — APPOINTMENT (OUTPATIENT)
Dept: ORTHOPEDIC SURGERY | Facility: CLINIC | Age: 89
End: 2025-03-10
Payer: MEDICARE

## 2025-03-10 DIAGNOSIS — M54.16 RADICULOPATHY, LUMBAR REGION: ICD-10-CM

## 2025-03-10 PROCEDURE — 99213 OFFICE O/P EST LOW 20 MIN: CPT

## 2025-03-10 PROCEDURE — 72170 X-RAY EXAM OF PELVIS: CPT

## 2025-03-10 PROCEDURE — 72100 X-RAY EXAM L-S SPINE 2/3 VWS: CPT

## 2025-03-10 RX ORDER — TRAMADOL HYDROCHLORIDE 50 MG/1
50 TABLET, COATED ORAL
Qty: 20 | Refills: 0 | Status: ACTIVE | COMMUNITY
Start: 2025-03-10 | End: 1900-01-01

## 2025-03-10 RX ORDER — METHYLPREDNISOLONE 4 MG/1
4 TABLET ORAL
Qty: 1 | Refills: 0 | Status: ACTIVE | COMMUNITY
Start: 2025-03-10 | End: 1900-01-01

## 2025-06-19 ENCOUNTER — APPOINTMENT (OUTPATIENT)
Dept: ORTHOPEDIC SURGERY | Facility: CLINIC | Age: 89
End: 2025-06-19

## 2025-06-19 PROCEDURE — 99213 OFFICE O/P EST LOW 20 MIN: CPT | Mod: 25

## 2025-06-19 PROCEDURE — 20610 DRAIN/INJ JOINT/BURSA W/O US: CPT | Mod: 50

## 2025-06-19 PROCEDURE — 99203 OFFICE O/P NEW LOW 30 MIN: CPT | Mod: 25

## 2025-06-19 RX ORDER — METHYLPREDNISOLONE 4 MG/1
4 TABLET ORAL
Qty: 1 | Refills: 0 | Status: ACTIVE | COMMUNITY
Start: 2025-06-19 | End: 1900-01-01

## 2025-08-11 ENCOUNTER — APPOINTMENT (OUTPATIENT)
Dept: ORTHOPEDIC SURGERY | Facility: CLINIC | Age: 89
End: 2025-08-11
Payer: MEDICARE

## 2025-08-11 VITALS — WEIGHT: 190 LBS | BODY MASS INDEX: 34.96 KG/M2 | HEIGHT: 62 IN

## 2025-08-11 DIAGNOSIS — M17.12 UNILATERAL PRIMARY OSTEOARTHRITIS, LEFT KNEE: ICD-10-CM

## 2025-08-11 DIAGNOSIS — M17.11 UNILATERAL PRIMARY OSTEOARTHRITIS, RIGHT KNEE: ICD-10-CM

## 2025-08-11 PROCEDURE — 99214 OFFICE O/P EST MOD 30 MIN: CPT | Mod: 25

## 2025-08-11 PROCEDURE — 20610 DRAIN/INJ JOINT/BURSA W/O US: CPT | Mod: 50
